# Patient Record
Sex: FEMALE | Race: WHITE | NOT HISPANIC OR LATINO | Employment: FULL TIME | URBAN - METROPOLITAN AREA
[De-identification: names, ages, dates, MRNs, and addresses within clinical notes are randomized per-mention and may not be internally consistent; named-entity substitution may affect disease eponyms.]

---

## 2024-11-22 ENCOUNTER — APPOINTMENT (OUTPATIENT)
Dept: RADIOLOGY | Facility: HOSPITAL | Age: 24
End: 2024-11-22
Payer: COMMERCIAL

## 2024-11-22 ENCOUNTER — HOSPITAL ENCOUNTER (EMERGENCY)
Facility: HOSPITAL | Age: 24
Discharge: HOME | End: 2024-11-22
Payer: COMMERCIAL

## 2024-11-22 VITALS
SYSTOLIC BLOOD PRESSURE: 115 MMHG | WEIGHT: 141 LBS | HEART RATE: 66 BPM | HEIGHT: 69 IN | RESPIRATION RATE: 14 BRPM | DIASTOLIC BLOOD PRESSURE: 86 MMHG | BODY MASS INDEX: 20.88 KG/M2 | TEMPERATURE: 98.2 F | OXYGEN SATURATION: 98 %

## 2024-11-22 DIAGNOSIS — M79.18 MUSCULOSKELETAL PAIN: ICD-10-CM

## 2024-11-22 DIAGNOSIS — R10.9 FLANK PAIN: Primary | ICD-10-CM

## 2024-11-22 LAB
ALBUMIN SERPL BCP-MCNC: 4.4 G/DL (ref 3.4–5)
ALP SERPL-CCNC: 40 U/L (ref 33–110)
ALT SERPL W P-5'-P-CCNC: 14 U/L (ref 7–45)
ANION GAP SERPL CALC-SCNC: 13 MMOL/L (ref 10–20)
APPEARANCE UR: ABNORMAL
APPEARANCE UR: ABNORMAL
AST SERPL W P-5'-P-CCNC: 19 U/L (ref 9–39)
BASOPHILS # BLD AUTO: 0.04 X10*3/UL (ref 0–0.1)
BASOPHILS NFR BLD AUTO: 0.6 %
BILIRUB SERPL-MCNC: 0.6 MG/DL (ref 0–1.2)
BILIRUB UR STRIP.AUTO-MCNC: NEGATIVE MG/DL
BILIRUB UR STRIP.AUTO-MCNC: NEGATIVE MG/DL
BUN SERPL-MCNC: 9 MG/DL (ref 6–23)
CALCIUM SERPL-MCNC: 9.4 MG/DL (ref 8.6–10.3)
CHLORIDE SERPL-SCNC: 102 MMOL/L (ref 98–107)
CO2 SERPL-SCNC: 26 MMOL/L (ref 21–32)
COLOR UR: ABNORMAL
COLOR UR: ABNORMAL
CREAT SERPL-MCNC: 0.58 MG/DL (ref 0.5–1.05)
EGFRCR SERPLBLD CKD-EPI 2021: >90 ML/MIN/1.73M*2
EOSINOPHIL # BLD AUTO: 0.13 X10*3/UL (ref 0–0.7)
EOSINOPHIL NFR BLD AUTO: 2.1 %
ERYTHROCYTE [DISTWIDTH] IN BLOOD BY AUTOMATED COUNT: 12.8 % (ref 11.5–14.5)
GLUCOSE SERPL-MCNC: 87 MG/DL (ref 74–99)
GLUCOSE UR STRIP.AUTO-MCNC: NORMAL MG/DL
GLUCOSE UR STRIP.AUTO-MCNC: NORMAL MG/DL
HCG UR QL IA.RAPID: NEGATIVE
HCT VFR BLD AUTO: 39.5 % (ref 36–46)
HGB BLD-MCNC: 13.8 G/DL (ref 12–16)
IMM GRANULOCYTES # BLD AUTO: 0.01 X10*3/UL (ref 0–0.7)
IMM GRANULOCYTES NFR BLD AUTO: 0.2 % (ref 0–0.9)
KETONES UR STRIP.AUTO-MCNC: NEGATIVE MG/DL
KETONES UR STRIP.AUTO-MCNC: NEGATIVE MG/DL
LEUKOCYTE ESTERASE UR QL STRIP.AUTO: NEGATIVE
LEUKOCYTE ESTERASE UR QL STRIP.AUTO: NEGATIVE
LIPASE SERPL-CCNC: 39 U/L (ref 9–82)
LYMPHOCYTES # BLD AUTO: 1.82 X10*3/UL (ref 1.2–4.8)
LYMPHOCYTES NFR BLD AUTO: 28.9 %
MAGNESIUM SERPL-MCNC: 1.71 MG/DL (ref 1.6–2.4)
MCH RBC QN AUTO: 31.7 PG (ref 26–34)
MCHC RBC AUTO-ENTMCNC: 34.9 G/DL (ref 32–36)
MCV RBC AUTO: 91 FL (ref 80–100)
MONOCYTES # BLD AUTO: 0.56 X10*3/UL (ref 0.1–1)
MONOCYTES NFR BLD AUTO: 8.9 %
NEUTROPHILS # BLD AUTO: 3.73 X10*3/UL (ref 1.2–7.7)
NEUTROPHILS NFR BLD AUTO: 59.3 %
NITRITE UR QL STRIP.AUTO: NEGATIVE
NITRITE UR QL STRIP.AUTO: NEGATIVE
NRBC BLD-RTO: 0 /100 WBCS (ref 0–0)
PH UR STRIP.AUTO: 6.5 [PH]
PH UR STRIP.AUTO: 7 [PH]
PLATELET # BLD AUTO: 264 X10*3/UL (ref 150–450)
POTASSIUM SERPL-SCNC: 3.8 MMOL/L (ref 3.5–5.3)
PROT SERPL-MCNC: 7.3 G/DL (ref 6.4–8.2)
PROT UR STRIP.AUTO-MCNC: NEGATIVE MG/DL
PROT UR STRIP.AUTO-MCNC: NEGATIVE MG/DL
RBC # BLD AUTO: 4.35 X10*6/UL (ref 4–5.2)
RBC # UR STRIP.AUTO: NEGATIVE /UL
RBC # UR STRIP.AUTO: NEGATIVE /UL
SODIUM SERPL-SCNC: 137 MMOL/L (ref 136–145)
SP GR UR STRIP.AUTO: 1.01
SP GR UR STRIP.AUTO: 1.01
UROBILINOGEN UR STRIP.AUTO-MCNC: NORMAL MG/DL
UROBILINOGEN UR STRIP.AUTO-MCNC: NORMAL MG/DL
WBC # BLD AUTO: 6.3 X10*3/UL (ref 4.4–11.3)

## 2024-11-22 PROCEDURE — 85025 COMPLETE CBC W/AUTO DIFF WBC: CPT | Performed by: NURSE PRACTITIONER

## 2024-11-22 PROCEDURE — 74177 CT ABD & PELVIS W/CONTRAST: CPT

## 2024-11-22 PROCEDURE — 2500000004 HC RX 250 GENERAL PHARMACY W/ HCPCS (ALT 636 FOR OP/ED): Performed by: NURSE PRACTITIONER

## 2024-11-22 PROCEDURE — 80053 COMPREHEN METABOLIC PANEL: CPT | Performed by: NURSE PRACTITIONER

## 2024-11-22 PROCEDURE — 2550000001 HC RX 255 CONTRASTS: Performed by: NURSE PRACTITIONER

## 2024-11-22 PROCEDURE — 83690 ASSAY OF LIPASE: CPT | Performed by: NURSE PRACTITIONER

## 2024-11-22 PROCEDURE — 96375 TX/PRO/DX INJ NEW DRUG ADDON: CPT

## 2024-11-22 PROCEDURE — 81003 URINALYSIS AUTO W/O SCOPE: CPT | Performed by: PHYSICIAN ASSISTANT

## 2024-11-22 PROCEDURE — 81003 URINALYSIS AUTO W/O SCOPE: CPT | Performed by: EMERGENCY MEDICINE

## 2024-11-22 PROCEDURE — 96374 THER/PROPH/DIAG INJ IV PUSH: CPT | Mod: 59

## 2024-11-22 PROCEDURE — 99284 EMERGENCY DEPT VISIT MOD MDM: CPT | Mod: 25

## 2024-11-22 PROCEDURE — 74177 CT ABD & PELVIS W/CONTRAST: CPT | Performed by: STUDENT IN AN ORGANIZED HEALTH CARE EDUCATION/TRAINING PROGRAM

## 2024-11-22 PROCEDURE — 81025 URINE PREGNANCY TEST: CPT | Performed by: PHYSICIAN ASSISTANT

## 2024-11-22 PROCEDURE — 36415 COLL VENOUS BLD VENIPUNCTURE: CPT | Performed by: NURSE PRACTITIONER

## 2024-11-22 PROCEDURE — 83735 ASSAY OF MAGNESIUM: CPT | Performed by: NURSE PRACTITIONER

## 2024-11-22 RX ORDER — KETOROLAC TROMETHAMINE 30 MG/ML
15 INJECTION, SOLUTION INTRAMUSCULAR; INTRAVENOUS ONCE
Status: COMPLETED | OUTPATIENT
Start: 2024-11-22 | End: 2024-11-22

## 2024-11-22 RX ORDER — ONDANSETRON HYDROCHLORIDE 2 MG/ML
4 INJECTION, SOLUTION INTRAVENOUS ONCE
Status: COMPLETED | OUTPATIENT
Start: 2024-11-22 | End: 2024-11-22

## 2024-11-22 ASSESSMENT — PAIN SCALES - GENERAL
PAINLEVEL_OUTOF10: 7
PAINLEVEL_OUTOF10: 7

## 2024-11-22 ASSESSMENT — PAIN DESCRIPTION - PAIN TYPE: TYPE: ACUTE PAIN

## 2024-11-22 ASSESSMENT — COLUMBIA-SUICIDE SEVERITY RATING SCALE - C-SSRS
6. HAVE YOU EVER DONE ANYTHING, STARTED TO DO ANYTHING, OR PREPARED TO DO ANYTHING TO END YOUR LIFE?: NO
2. HAVE YOU ACTUALLY HAD ANY THOUGHTS OF KILLING YOURSELF?: NO
1. IN THE PAST MONTH, HAVE YOU WISHED YOU WERE DEAD OR WISHED YOU COULD GO TO SLEEP AND NOT WAKE UP?: NO

## 2024-11-22 ASSESSMENT — PAIN - FUNCTIONAL ASSESSMENT: PAIN_FUNCTIONAL_ASSESSMENT: 0-10

## 2024-11-22 NOTE — Clinical Note
Piper Duque was seen and treated in our emergency department on 11/22/2024.  She may return to work on 11/24/2024.       If you have any questions or concerns, please don't hesitate to call.      Zaynab Russo, APRN-CNP

## 2024-11-22 NOTE — ED TRIAGE NOTES
Patient present to the emergency room c/o right flank pain x2 days. +nausea, denies fever. Denies s/s of UTI. August hx of kidney stones 7/10pain

## 2024-11-22 NOTE — ED PROVIDER NOTES
HPI   Chief Complaint   Patient presents with    Flank Pain     Patient present to the emergency room c/o right flank pain x2 days. +nausea, denies fever. Denies s/s of UTI. August hx of kidney stones 7/10pain           History provided by:  Patient   used: No      24-year-old female presents ED for evaluation of right flank pain that radiates to her right upper abdomen onset 2 days ago.  She has taken over-the-counter medication at home for symptoms without relief.  She denies any history of kidney stones.  Endorses associated nausea but denies any fevers, chills, urinary symptoms, chest pain or shortness of breath.  Denies any trauma or major anticoagulation use.  She does smoke but denies any excessive alcohol use or illicit drug use.  Denies any numbness, tingling, weakness or inability to ambulate.  Denies any additional symptoms or complaints this time.  Denies any chance for pregnancy or STD, LMP approximately 1 month ago.    ROS is otherwise negative unless stated above      Patient History   No past medical history on file.  No past surgical history on file.  No family history on file.  Social History     Tobacco Use    Smoking status: Never    Smokeless tobacco: Never   Vaping Use    Vaping status: Every Day   Substance Use Topics    Alcohol use: Yes    Drug use: Not Currently       Physical Exam   ED Triage Vitals [11/22/24 1830]   Temperature Heart Rate Respirations BP   36.8 °C (98.2 °F) 76 16 126/87      Pulse Ox Temp Source Heart Rate Source Patient Position   99 % Temporal Monitor Sitting      BP Location FiO2 (%)     Right arm --       Physical Exam    VS: As documented in the triage note and EMR flowsheet from this visit were reviewed.    GEN: NAD, nontoxic, well-appearing, ambulates without difficulty  EYES: PERRLA  HEENT: Airway patent  CARD: RRR, nontender chest, no crepitus deformities, no JVD, no murmurs rubs or gallops ; No edema noted.  Positive pulses bilaterally  throughout.  Capillary refill less than 3 seconds.  PULMONARY: Clear all lung fields. Moving air well, Nonlabored, no accessory muscle use, able to speak complete sentences  ABDOMEN: Abdomen soft, non-distended, no rebound, no guarding. Bowel sounds normal in all 4 quadrants.  Right upper quadrant tenderness to palpation.  Right CVA tenderness.  No masses or organomegaly noted.  No evidence of peritonitis. Negative Posadas's and McBurney's point tenderness.    : deferred  MUSK: Spine appears normal, range of motion is not limited, no muscle or joint tenderness. Strength 5 out of 5 equal bilaterally throughout.  No step-offs, deformities or additional signs of trauma noted.  No spinal/midline tenderness to palpation  SKIN: Skin normal color for race, warm, dry and intact. No evidence of trauma. No rash noted.  NEURO: Alert and oriented x 3, speech is clear, no obvious deficits noted.   PSYCH: Alert and oriented to person, place, time/situation. normal mood and affect. No apparent risk to self or others. Thoughts are linear.  Does not appear decompensated.  Does not appear internally stimulated.  LYMPH: No adenopathy or splenomegaly. No cervical, supraclavicular or inguinal lymphadenopathy.  ED Course & MDM   Diagnoses as of 11/22/24 2224   Flank pain   Musculoskeletal pain                 No data recorded                                 Medical Decision Making    This is a 24-year-old female who presents ED for evaluation of right flank pain onset a couple days ago.  She is Amling independently without difficulty or dyspnea.  She has no red flag signs for back pain.  There is no evidence of acute surgical abdomen.  Workup reviewed and unremarkable.  Educate that this is likely musculoskeletal pain.  Given medications for her symptoms with relief.  She remained hemodynamic stable and is tolerating p.o. without difficulty.  Findings and plan were discussed with patient and she was agreeable for plan and acknowledged  understanding.  Educated on symptomatic and supportive care at home.    EKG Interpretation: N/A    Differential Diagnoses Considered: Musculoskeletal pain, anxiety, UTI, ovarian cyst, kidney stone, cholecystitis, acute intra-abdominal process    Chronic Medical Conditions Significantly Affecting Care: None    External Records Reviewed: I reviewed recent and relevant outside records including: PCP notes, prior discharge summary, previous radiologic studies, HIE    Independent Interpretation of Studies:  I independently interpreted: None    Escalation of Care:  Appropriate for outpatient management with primary care provider follow-up for reevaluation.  Return precautions discussed and patient verbalized understanding. All questions and concerns were addressed.    Social Determinants of Health Significantly Affecting Care:  None    Prescription Drug Consideration: Over-the-counter Tylenol and ibuprofen p.o. at home as needed for pain.  Additionally use warm compresses to the site    Diagnostic testing considered: Consider pelvic ultrasound but CT scan showed no concern for ovarian cyst and I do not believe the patient is suffering from ovarian torsion due to her otherwise reassuring assessment    Discussion of Management with Other Providers:   I discussed the patient/results with: none    *Please note that portions of this note may have been completed with a voice recognition program.  Efforts were made to edit the dictations but occasionally, words are mis-transcribed.    Procedure  Procedures     Zaynab Russo, BRUCE-CNP  11/22/24 7528

## 2024-11-23 LAB — HOLD SPECIMEN: NORMAL
